# Patient Record
Sex: MALE | URBAN - METROPOLITAN AREA
[De-identification: names, ages, dates, MRNs, and addresses within clinical notes are randomized per-mention and may not be internally consistent; named-entity substitution may affect disease eponyms.]

---

## 2019-09-30 ENCOUNTER — IMPORTED ENCOUNTER (OUTPATIENT)
Dept: URBAN - METROPOLITAN AREA CLINIC 9 | Facility: CLINIC | Age: 64
End: 2019-09-30

## 2019-10-11 ENCOUNTER — IMPORTED ENCOUNTER (OUTPATIENT)
Dept: URBAN - METROPOLITAN AREA CLINIC 9 | Facility: CLINIC | Age: 64
End: 2019-10-11

## 2019-11-13 ENCOUNTER — IMPORTED ENCOUNTER (OUTPATIENT)
Dept: URBAN - METROPOLITAN AREA CLINIC 9 | Facility: CLINIC | Age: 64
End: 2019-11-13

## 2019-12-04 ENCOUNTER — IMPORTED ENCOUNTER (OUTPATIENT)
Dept: URBAN - METROPOLITAN AREA CLINIC 9 | Facility: CLINIC | Age: 64
End: 2019-12-04

## 2021-01-22 NOTE — PATIENT DISCUSSION
Reordered trials with same Rx. Possible defect in lens that was given to patient OD. Ok to order if happy with comfort/vision.

## 2021-10-16 ASSESSMENT — VISUAL ACUITY
OD_SC: 20/60 SN
OS_PH: 20/50 + SN
OS_SC: 20/200 SN
OD_PH: 20/40 SN
OD_CC: 20/25 -2 SN
OS_SC: CF 3' LV
OD_SC: 20/400 SN
OS_CC: 20/60 - SN
OS_CC: 20/30 SN
OD_CC: 20/25 SN

## 2021-10-16 ASSESSMENT — KERATOMETRY
OS_AXISANGLE_DEGREES: 180
OS_AXISANGLE_DEGREES: 111
OD_AXISANGLE_DEGREES: 103
OS_K1POWER_DIOPTERS: 46.75
OS_AXISANGLE2_DEGREES: 90
OS_AXISANGLE_DEGREES: 113
OD_AXISANGLE2_DEGREES: 180
OD_AXISANGLE2_DEGREES: 13
OS_AXISANGLE2_DEGREES: 23
OD_K2POWER_DIOPTERS: 47.25
OS_K2POWER_DIOPTERS: 47.25
OD_AXISANGLE_DEGREES: 90
OS_K2POWER_DIOPTERS: 47.25
OS_K2POWER_DIOPTERS: 47.5
OD_K2POWER_DIOPTERS: 47.25
OS_K1POWER_DIOPTERS: 47.25
OD_K1POWER_DIOPTERS: 46.75
OD_K1POWER_DIOPTERS: 47
OS_AXISANGLE2_DEGREES: 21
OS_K1POWER_DIOPTERS: 46.75

## 2021-10-16 ASSESSMENT — TONOMETRY
OS_IOP_MMHG: 25
OS_IOP_MMHG: 23
OD_IOP_MMHG: 29
OS_IOP_MMHG: 15
OD_IOP_MMHG: 15
OD_IOP_MMHG: 23

## 2022-07-06 RX ORDER — LISINOPRIL 10 MG/1
TABLET ORAL
COMMUNITY
End: 2022-09-27

## 2022-07-06 RX ORDER — BUPROPION HYDROCHLORIDE 300 MG/1
TABLET ORAL
COMMUNITY

## 2022-07-06 RX ORDER — PANTOPRAZOLE SODIUM 40 MG/1
TABLET, DELAYED RELEASE ORAL
COMMUNITY

## 2022-07-06 RX ORDER — ROSUVASTATIN CALCIUM 10 MG/1
TABLET, COATED ORAL
COMMUNITY
End: 2022-09-27

## 2022-07-06 RX ORDER — BUPROPION HYDROCHLORIDE 150 MG/1
TABLET ORAL
COMMUNITY

## 2022-10-28 PROBLEM — H93.13 TINNITUS OF BOTH EARS: Status: ACTIVE | Noted: 2022-10-28

## 2022-10-28 PROBLEM — M24.111 DEGENERATIVE TEAR OF GLENOID LABRUM OF RIGHT SHOULDER: Status: ACTIVE | Noted: 2022-10-28

## 2022-10-28 PROBLEM — F33.1 MODERATE EPISODE OF RECURRENT MAJOR DEPRESSIVE DISORDER (HCC): Status: ACTIVE | Noted: 2022-10-28

## 2022-10-28 PROBLEM — J30.9 ALLERGIC RHINITIS: Status: ACTIVE | Noted: 2022-10-28

## 2022-10-28 PROBLEM — E78.00 HIGH CHOLESTEROL: Status: ACTIVE | Noted: 2022-10-28

## 2022-10-28 PROBLEM — E78.2 MIXED HYPERLIPIDEMIA: Status: ACTIVE | Noted: 2022-10-28

## 2022-10-28 PROBLEM — F32.A DEPRESSION: Status: ACTIVE | Noted: 2022-10-28

## 2022-10-28 PROBLEM — I10 HYPERTENSION: Status: ACTIVE | Noted: 2022-10-28

## 2022-10-28 PROBLEM — J30.1 CHRONIC SEASONAL ALLERGIC RHINITIS DUE TO POLLEN: Status: ACTIVE | Noted: 2022-10-28

## 2022-10-28 PROBLEM — R41.3 MEMORY IMPAIRMENT: Status: ACTIVE | Noted: 2022-10-28

## 2022-10-28 PROBLEM — E66.9 OBESITY: Status: ACTIVE | Noted: 2022-10-28

## 2022-10-28 PROBLEM — N52.35 ERECTILE DYSFUNCTION FOLLOWING RADIATION THERAPY: Status: ACTIVE | Noted: 2022-10-28

## 2022-10-28 PROBLEM — C61 PROSTATE CANCER (HCC): Status: ACTIVE | Noted: 2022-10-28

## 2022-12-13 ENCOUNTER — NEW PATIENT (OUTPATIENT)
Dept: URBAN - METROPOLITAN AREA CLINIC 9 | Facility: CLINIC | Age: 67
End: 2022-12-13

## 2022-12-13 PROCEDURE — 92133 CPTRZD OPH DX IMG PST SGM ON: CPT

## 2022-12-13 PROCEDURE — 92004 COMPRE OPH EXAM NEW PT 1/>: CPT

## 2022-12-13 PROCEDURE — 92015 DETERMINE REFRACTIVE STATE: CPT

## 2022-12-13 ASSESSMENT — VISUAL ACUITY
OS_SC: J7
OS_CC: 20/70
OD_BCVA: 20/25
OD_CC: 20/30
OD_SC: J5

## 2022-12-13 ASSESSMENT — TONOMETRY
OS_IOP_MMHG: 15
OD_IOP_MMHG: 16

## 2023-01-17 ENCOUNTER — ESTABLISHED PATIENT (OUTPATIENT)
Dept: URBAN - METROPOLITAN AREA CLINIC 9 | Facility: CLINIC | Age: 68
End: 2023-01-17

## 2023-01-17 DIAGNOSIS — H26.492: ICD-10-CM

## 2023-01-17 PROCEDURE — 99214 OFFICE O/P EST MOD 30 MIN: CPT

## 2023-01-17 ASSESSMENT — TONOMETRY
OD_IOP_MMHG: 20
OS_IOP_MMHG: 19

## 2023-01-17 ASSESSMENT — VISUAL ACUITY
OD_CC: 20/30
OS_CC: 20/70
OS_BCVA: 20/60
OD_BCVA: 20/25

## 2024-06-04 ENCOUNTER — ESTABLISHED PATIENT (OUTPATIENT)
Dept: URBAN - METROPOLITAN AREA CLINIC 10 | Facility: CLINIC | Age: 69
End: 2024-06-04

## 2024-06-04 DIAGNOSIS — H40.013: ICD-10-CM

## 2024-06-04 DIAGNOSIS — H43.813: ICD-10-CM

## 2024-06-04 DIAGNOSIS — H52.13: ICD-10-CM

## 2024-06-04 DIAGNOSIS — H04.123: ICD-10-CM

## 2024-06-04 PROCEDURE — 92014 COMPRE OPH EXAM EST PT 1/>: CPT

## 2024-06-04 PROCEDURE — 92015 DETERMINE REFRACTIVE STATE: CPT

## 2024-06-04 PROCEDURE — 92133 CPTRZD OPH DX IMG PST SGM ON: CPT

## 2024-06-04 ASSESSMENT — KERATOMETRY
OD_K1POWER_DIOPTERS: 46.50
OD_K2POWER_DIOPTERS: 47.50
OS_K2POWER_DIOPTERS: 47.25
OD_AXISANGLE2_DEGREES: 1
OS_K1POWER_DIOPTERS: 46.25
OS_AXISANGLE2_DEGREES: 176
OD_AXISANGLE_DEGREES: 91
OS_AXISANGLE_DEGREES: 86

## 2024-06-04 ASSESSMENT — VISUAL ACUITY
OD_CC: 20/40-1
OS_CC: 20/30
OD_PH: 20/30-2

## 2024-06-04 ASSESSMENT — TONOMETRY
OS_IOP_MMHG: 21
OD_IOP_MMHG: 17